# Patient Record
Sex: FEMALE | Race: ASIAN | NOT HISPANIC OR LATINO | URBAN - METROPOLITAN AREA
[De-identification: names, ages, dates, MRNs, and addresses within clinical notes are randomized per-mention and may not be internally consistent; named-entity substitution may affect disease eponyms.]

---

## 2017-01-27 ENCOUNTER — OFFICE VISIT - HEALTHEAST (OUTPATIENT)
Dept: INTERNAL MEDICINE | Facility: CLINIC | Age: 27
End: 2017-01-27

## 2017-01-27 DIAGNOSIS — R10.9 ABDOMINAL CRAMPS: ICD-10-CM

## 2017-01-27 DIAGNOSIS — L70.8 OTHER ACNE: ICD-10-CM

## 2017-01-27 RX ORDER — CLINDAMYCIN PHOSPHATE 10 MG/G
GEL TOPICAL 2 TIMES DAILY
Qty: 30 G | Refills: 1 | Status: SHIPPED | OUTPATIENT
Start: 2017-01-27

## 2017-01-27 RX ORDER — CETIRIZINE HYDROCHLORIDE 10 MG/1
10 TABLET ORAL DAILY
Status: SHIPPED | COMMUNITY
Start: 2017-01-27

## 2017-01-27 ASSESSMENT — MIFFLIN-ST. JEOR: SCORE: 1141.36

## 2017-01-30 LAB
GLIADIN IGA SER-ACNC: 0.9 U/ML
GLIADIN IGG SER-ACNC: <0.4 U/ML
IGA SERPL-MCNC: 314 MG/DL (ref 65–400)
TTG IGA SER-ACNC: 0.4 U/ML
TTG IGG SER-ACNC: <0.6 U/ML

## 2021-05-30 VITALS — WEIGHT: 106 LBS | HEIGHT: 61 IN | BODY MASS INDEX: 20.01 KG/M2

## 2021-06-25 NOTE — PROGRESS NOTES
Progress Notes by Shaheen Olmos at 1/27/2017  8:00 AM     Author: Shaheen Olmos Service: -- Author Type: Nurse Practitioner    Filed: 2/7/2017  9:33 AM Encounter Date: 1/27/2017 Status: Signed    : Shaheen Olmos [facility]    Date of Service: 1/27/2017  Primary Provider: ROMEL Mcmanus    Patient Care Team:  ROMEL Mcmanus as PCP - General     ______________________________________________________________________     Patient's Pharmacy:    [facility] [id number] - [place], [place] - [address] AT [facility] [address]  [facility] [address]  [place] [address]  Phone: [phone] Fax: [phone]     Patient's Insurance:    Payor: PREFERRED ONE / Plan: PREFERRED ONE HMO / Product Type: HMO /      ______________________________________________________________________     Hui Ross is 26 y.o. female who comes in today for:    Chief Complaint   Patient presents with   ? Allergic Reaction     Has been cutting foods out of diet that have gluten in them. She gets bloating, Gas, nausea, and diarrhea. Also thinks that it could be Lactose intolerance.        Patient Active Problem List   Diagnosis   ? Acne   ? Allergic Rhinitis   ? Allergy To Animals     Current Outpatient Prescriptions   Medication Sig   ? cetirizine (ZYRTEC) 10 MG tablet Take 10 mg by mouth daily.   ? clindamycin (CLINDAGEL) 1 % gel Apply topically 2 (two) times a day.     Social History     Social History   ? Marital status: Single     Spouse name: N/A   ? Number of children: N/A   ? Years of education: N/A     Occupational History   ? Not on file.     Social History Main Topics   ? Smoking status: Never Smoker   ? Smokeless tobacco: Not on file   ? Alcohol use Not on file      Comment: wine once a month   ? Drug use: No   ? Sexual activity: No     Other Topics Concern   ? Not on file     Social History Narrative  "    ______________________________________________________________________     History of present illness:  Hui Ross is a 26-year-old female with a past medical history that includes acne and allergic rhinitis who presents in clinic today for evaluation for celiac disease.  She states that she develops an upset stomach after eating foods that contain gluten.  She has \"cut out\" gluten in her diet significantly.  She states that her symptoms include bloating, diarrhea, abdominal pain and cramping with consumption of foods containing gluten.  She denies any blood in her stools.  These symptoms have been persisting for proximal and 4 years.  She states she cut out gluten from her diet approximate 2 years ago and her symptoms have improved greatly.  She identifies food allergens such as bread, cookies, pasta, milk, soy, and fatty beef that approximately 1-2 hours after ingestion develops the previously noted gastrointestinal symptoms.  She denies any systemic symptoms such as rash, hives, angioedema.  She states that she does not keep a food diary, but eats nearly the same foods routinely such as: Rice, corn pasta, turkey, eggs, and almonds.  Incidentally, after eating almonds, in the recent past, she was noted to develop numbness around her mouth which she states the symptoms resolved on their own.  She reports the symptom frequency now about 1 time every couple of weeks.  She has tried probiotics and enzymes in the past which she thinks may have been of some benefit.  She also takes cetirizine 10 mg once daily year round now and has done so for the last 2 years.  She states that she was allergy tested when she used to see Dr. Parvin Dash, approximately 10 years ago, and was allergic to pollen, cat, dog, gerbil dander, and dust mites.  She states she has siblings with similar GI problems in the family.      On review of systems, the patient denies any fever, chills, sweats, chest pain or shortness of breath or " "abdominal pain.  Positive for symptoms as noted in HPI.    ______________________________________________________________________    Wt Readings from Last 3 Encounters:   01/27/17 106 lb (48.1 kg)   03/16/16 106 lb 8 oz (48.3 kg)     BP Readings from Last 3 Encounters:   01/27/17 110/84   03/16/16 100/58     Visit Vitals   ? /84   ? Pulse 62   ? Ht 5' 1.2\" (1.554 m)   ? Wt 106 lb (48.1 kg)   ? BMI 19.9 kg/m2        Physical Exam:  General Appearance: Alert, cooperative, no distress, appears stated age  Head: Normocephalic, without obvious abnormality, atraumatic  Eyes: PERRL, conjunctiva/corneas clear  Ears: Normal TM's and external ear canals, both ears  Nose: Nares normal, septum midline,mucosa normal, no drainage  Throat: Lips, mucosa, and tongue normal; teeth and gums normal  Neck: Supple, symmetrical, trachea midline, no adenopathy;  thyroid: not enlarged, symmetric, no tenderness/mass/nodules  Lungs: Clear to auscultation bilaterally, respirations unlabored  Heart: Regular rate and rhythm, S1 and S2 normal, no murmur, rub, or gallop,  Abdomen: Soft, non-tender, bowel sounds active all four quadrants,  no masses, no organomegaly  Neurologic: She is alert. She has normal reflexes.   Psychiatric: She has a normal mood and affect.     Celiac(Gluten)Antibody Panel    Status:  Final result      Ref Range & Units 1/27/17  9:22 AM     Gliadin IgA 0.0-<7.0 U/mL 0.9   Gliadin IgG 0.0-<7.0 U/mL <0.4   Tissue Transglutaminase IgG AB 0.0-<7.0 U/mL <0.6   Tissue Transglutaminase IgA AB 0.0-<7.0 U/mL 0.4   Immunoglobulin A 65 - 400 mg/dL 314   Resulting Agency  [facility]   Narrative     < 7 U/mL = Negative  7-10 U/mL = Equivocal  > 10 U/mL = Positive    Positive results for the tTG and/or gliadin antibodies indicate possible celiac disease and a small intestinal biopsy my be indicated. Antibody levels decrease in patients on gluten-free diets; therefore, negative results do not exclude celiac disease. Total serum IgA is " measured to identify selective IgA deficiency, which is present in up to 10% of celiac disease patients. Such patients would have negative results on IgA assays, but may have positive results on IgG antibody assays.                  _________________________________________________________________    Assessment:    1. Abdominal cramps - labs appear negative for gluten and celiac disease   2. Acne - refilled clindamycin gel      ______________________________________________________________________      PHQ-2 Total Score: 0 (3/16/2016  1:00 PM)  No Data Recorded     Plan:  Patient Instructions   1. Check lab work for Celiac disease  -IgA anti-tissue TTG antibody  -Total IgA  -IgG DGP    2.   Refill clindamycin topical for your acne symptoms.      Shaheen Olmos, [facility]  Internal Medicine  [facility]     Return if symptoms worsen or fail to improve.